# Patient Record
Sex: FEMALE
[De-identification: names, ages, dates, MRNs, and addresses within clinical notes are randomized per-mention and may not be internally consistent; named-entity substitution may affect disease eponyms.]

---

## 2022-09-29 ENCOUNTER — NURSE TRIAGE (OUTPATIENT)
Dept: OTHER | Facility: CLINIC | Age: 57
End: 2022-09-29

## 2022-09-29 NOTE — TELEPHONE ENCOUNTER
Caller with questions about virtual triage. Writer able to answer questions. No triage required. Reason for Disposition   General information question, no triage required and triager able to answer question    Protocols used:  Information Only Call - No Triage-ADULT-

## 2022-09-29 NOTE — TELEPHONE ENCOUNTER
Subjective: Caller states \"I have been experiencing, it started as razor burn in my bikini area where my underwear sits. No more bumps, red, itchy, smooth, dry. The hair has not grown back in.\"     Current Symptoms: Red, itchy, smooth - no bumps, dry, sensitive skin area, warm    Onset: 10 days ago; gradual    Associated Symptoms: NA    Pain Severity: 10/10; burning, stinging; constant    Temperature: patient denies, unknown    What has been tried: diaper rash cream, vagisil, cortisone cream.    LMP: NA Pregnant: NA    Recommended disposition: See in Office Today or Tomorrow    Care advice provided, patient verbalizes understanding; denies any other questions or concerns; instructed to call back for any new or worsening symptoms. Patient/caller agrees to follow-up with PCP     This triage is a result of a call to [address] [place] [address]. Please do not respond to the triage nurse through this encounter. Any subsequent communication should be directly with the patient.       Reason for Disposition   Genital itching - female   MODERATE-SEVERE itching (i.e., interferes with school, work, or sleep)    Protocols used: Itching - Localized-ADULT-[place], [place] Symptoms-ADULT-[place]